# Patient Record
(demographics unavailable — no encounter records)

---

## 2025-04-01 NOTE — DISCUSSION/SUMMARY
[FreeTextEntry1] : CP/palps stress ekg reviewed. Inclined towards a conservative follow up in this patient. We had a careful discussion regarding diet and exercise. Will be happy to re-evaluate. check echo if able to approve and schedule. Borderline HTN - Malik had an extensive discussion regarding her blood pressure management. Patient will continue taking current medications in addition to maintaining a low Na diet, with periodic b/p checks at home.

## 2025-04-01 NOTE — REASON FOR VISIT
[Symptom and Test Evaluation] : symptom and test evaluation [FreeTextEntry1] : 24 year old F comes in secondary to chest pain, dyspnea, fatigue and palpitations. Symptoms started some time ago. She notes them with activity and at rest. She is normally quite active. She has not seen primary care in a few months. She is working with pulm to improve on her asthma management.